# Patient Record
Sex: MALE | Race: WHITE
[De-identification: names, ages, dates, MRNs, and addresses within clinical notes are randomized per-mention and may not be internally consistent; named-entity substitution may affect disease eponyms.]

---

## 2018-02-06 ENCOUNTER — HOSPITAL ENCOUNTER (INPATIENT)
Dept: HOSPITAL 36 - ER | Age: 68
LOS: 3 days | Discharge: NURSING, REHAB OR LONG-TERM CARE | DRG: 871 | End: 2018-02-09
Attending: FAMILY MEDICINE | Admitting: FAMILY MEDICINE
Payer: MEDICARE

## 2018-02-06 DIAGNOSIS — N18.6: ICD-10-CM

## 2018-02-06 DIAGNOSIS — I12.0: ICD-10-CM

## 2018-02-06 DIAGNOSIS — E86.0: ICD-10-CM

## 2018-02-06 DIAGNOSIS — E87.1: ICD-10-CM

## 2018-02-06 DIAGNOSIS — Z83.3: ICD-10-CM

## 2018-02-06 DIAGNOSIS — Z99.2: ICD-10-CM

## 2018-02-06 DIAGNOSIS — A41.9: Primary | ICD-10-CM

## 2018-02-06 DIAGNOSIS — E78.5: ICD-10-CM

## 2018-02-06 DIAGNOSIS — Z86.73: ICD-10-CM

## 2018-02-06 DIAGNOSIS — F03.90: ICD-10-CM

## 2018-02-06 DIAGNOSIS — E11.22: ICD-10-CM

## 2018-02-06 DIAGNOSIS — J18.9: ICD-10-CM

## 2018-02-06 DIAGNOSIS — N39.0: ICD-10-CM

## 2018-02-06 DIAGNOSIS — Z93.0: ICD-10-CM

## 2018-02-06 DIAGNOSIS — Z93.1: ICD-10-CM

## 2018-02-06 DIAGNOSIS — Z82.49: ICD-10-CM

## 2018-02-06 DIAGNOSIS — D63.1: ICD-10-CM

## 2018-02-06 DIAGNOSIS — Z79.82: ICD-10-CM

## 2018-02-06 LAB
ANION GAP SERPL CALC-SCNC: 15.9 MMOL/L (ref 7–16)
APPEARANCE UR: (no result)
BACTERIA #/AREA URNS HPF: (no result) /HPF
BASOPHILS # BLD AUTO: 0.2 TH/CUMM (ref 0–0.2)
BASOPHILS NFR BLD AUTO: 1.7 % (ref 0–2)
BILIRUB UR-MCNC: NEGATIVE MG/DL
BUN SERPL-MCNC: 90 MG/DL (ref 7–25)
CALCIUM SERPL-MCNC: 11.8 MG/DL (ref 8.6–10.3)
CHLORIDE SERPL-SCNC: 95 MEQ/L (ref 98–107)
CHOLEST SERPL-MCNC: 129 MG/DL (ref ?–200)
CK SERPL-CCNC: 27 U/L (ref 30–223)
CO2 SERPL-SCNC: 24.2 MEQ/L (ref 21–31)
COLOR UR: YELLOW
CREAT SERPL-MCNC: 6.4 MG/DL (ref 0.7–1.3)
EOSINOPHIL # BLD AUTO: 0.6 TH/CMM (ref 0.1–0.4)
EOSINOPHIL NFR BLD AUTO: 5.9 % (ref 0–5)
EPI CELLS URNS QL MICRO: (no result) /LPF
ERYTHROCYTE [DISTWIDTH] IN BLOOD BY AUTOMATED COUNT: 15.5 % (ref 11.5–20)
GLUCOSE SERPL-MCNC: 109 MG/DL (ref 70–105)
GLUCOSE UR STRIP-MCNC: NEGATIVE MG/DL
HCT VFR BLD CALC: 36.6 % (ref 41–60)
HDLC SERPL-MCNC: 47 MG/DL (ref 23–92)
HGB BLD-MCNC: 11.9 GM/DL (ref 12–16)
INR PPP: 0.93 (ref 0.5–1.4)
INR PPP: 0.93 (ref 0.5–1.4)
KETONES UR STRIP-MCNC: NEGATIVE MG/DL
LEUKOCYTE ESTERASE UR-ACNC: (no result)
LYMPHOCYTE AB SER FC-ACNC: 0.8 TH/CMM (ref 1.5–3)
LYMPHOCYTES NFR BLD AUTO: 7.6 % (ref 20–50)
MCH RBC QN AUTO: 30.6 PG (ref 27–31)
MCHC RBC AUTO-ENTMCNC: 32.4 PG (ref 28–36)
MCV RBC AUTO: 94.5 FL (ref 80–99)
MICRO URNS: YES
MONOCYTES # BLD AUTO: 0.9 TH/CMM (ref 0.3–1)
MONOCYTES NFR BLD AUTO: 8.8 % (ref 2–10)
NEUTROPHILS # BLD: 7.5 TH/CMM (ref 1.8–8)
NEUTROPHILS NFR BLD AUTO: 76 % (ref 40–80)
NITRITE UR QL STRIP: NEGATIVE
PH UR STRIP: 6 [PH] (ref 4.6–8)
PLATELET # BLD: 396 TH/CMM (ref 150–400)
PMV BLD AUTO: 6.3 FL
POTASSIUM SERPL-SCNC: 4.1 MEQ/L (ref 3.5–5.1)
PROT UR STRIP-MCNC: >=300 MG/DL
PROTHROMBIN TIME: 9.7 SECONDS (ref 9.5–11.5)
PROTHROMBIN TIME: 9.7 SECONDS (ref 9.5–11.5)
RBC # BLD AUTO: 3.87 MIL/CMM (ref 3.8–5.8)
RBC # UR STRIP: (no result) /UL
SODIUM SERPL-SCNC: 131 MEQ/L (ref 136–145)
SP GR UR STRIP: 1.02 (ref 1–1.03)
TRIGL SERPL-MCNC: 123 MG/DL (ref ?–150)
URINALYSIS COMPLETE PNL UR: (no result)
UROBILINOGEN UR STRIP-ACNC: 0.2 E.U./DL (ref 0.2–1)
WBC # BLD AUTO: 10 TH/CMM (ref 4.8–10.8)
WBC #/AREA URNS HPF: (no result) /HPF (ref 0–5)

## 2018-02-06 PROCEDURE — Z7610: HCPCS

## 2018-02-06 NOTE — DIAGNOSTIC IMAGING REPORT
CHEST X-RAY: AP view



INDICATION: pain



COMPARISON: None



FINDINGS: Tracheostomy tube is noted.  Right dialysis catheter is seen

with tip in the right atrium.  Increased interstitial lung markings are

noted.  This is greatest along the left lung base.  There may be a trace

left effusion.  No focal consolidation.  Cardiomegaly is noted. 

Degenerative changes of the spine are noted.



IMPRESSION:



Increased interstitial lung markings, greatest within the lung base,

nonspecific.  There is probable atelectasis of the left lung base.  No

focal consolidation or evidence of [place] CHF



Cardiomegaly.

## 2018-02-06 NOTE — ED PHYSICIAN CHART
ED Chief Complaint/HPI





- Patient Information


Date Seen:: 02/06/18


Time Seen:: 13:15


Chief Complaint:: Weakness


History of Present Illness:: 





onset x one day of weakness and dizziness with elevated BP while at Dialysis 

Center today; no report of LOC, ALOC, AMS, H/As, neck pain, C/P, SOB, Abd. Pain

, A/N/V/D/C, fever, chills, or urinary s/s


Historian:: Patient, EMS


Review:: Nurse's Note Reviewed, Old Chart Reviewed, EMS run form Reviewed





ED Review of Systems





- Review of Systems


General/Constitutional: No fever, No chills, No weight loss, No weakness, No 

diaphoresis, No edema, No loss of appetite


Skin: No skin lesions, No rash, No bruising


Head: No headache, No light-headedness


Eyes: No loss of vision, No pain, No diplopia


ENT: No earache, No nasal drainage, No sore throat, No tinnitus


Neck: No neck pain, No swelling, No thyromegaly, No stiffness, No mass noted


Cardio Vascular: No chest pain, No palpitations, No PND, No orthopnea, No edema


Pulmonary: No SOB, No cough, No sputum, No wheezing


GI: No nausea, No vomiting, No diarrhea, No pain, No melena, No hematochezia, 

No constipation, No hematemesis


G/U: No dysuria, No frequency, No hematuria, No nacturia


Musculoskeletal: No bone or joint pain, No back pain, No muscle pain


Endocrine: No polyuria, No polydipsia


Psychiatric: No prior psych history, No depression, No anxiety, No suicidal 

ideation, No homicidal ideation, No auditory hallucination, No visual 

hallucination


Hematopoietic: No bruising, No lymphadenopathy


Allergic/Immuno: No urticaria, No angioedema


Neurological: No syncope, Focal symptoms, Weakness, Paresthesia, No headache, 

No seizure, Dizziness, Confusion, No vertigo





ED Past Medical History





- Past Medical History


Obtainable: Yes


Past Medical History: HTN, DM, CVA/TIA, Dyslipidemia, ESRD, Dementia


Family History: Diabetes Melitus, HTN


Social History: Non Smoker, No Alcohol, No Drug Use, Single, Care Facility


Surgical History: PEG/GTube, other (Tracheostomy)


Psychiatricy History: Dementia (Tracheostomy)


Medication: Reviewed





Family Medical History





- Family Member


  ** Mother


History Unknown: Yes





ED Physical Exam





- Physical Examination


General/Constitutional: Awake, Well-developed, well-nourished, Alert, No 

distress, GCS 15, Non-toxic appearing, Ambulatory


Head: Atraumatic


Eyes: Lids, conjuctiva normal, PERRL, EOMI


Skin: Nl inspection, No rash, No skin lesions, No ecchymosis, Well hydrated, No 

lymphadenopathy


ENMT: External ears, nose nl, TM canals nl, Nasal exam nl, Lips, teeth, gums nl

, Oropharynx nl, Tonsils nl


Neck: Nontender, Full ROM w/o pain, No JVD, No nuchal rigidity, No bruit, No 

mass, No stridor


Respiratory: Nl effort/Exclusion


Other Respiratory comments:: 





Lungs: + Rales and Rhonchi


Cardio Vascular: RRR, No murmur, gallop, rubs, NL S1 S2, Carotid/Femoral/Distal 

pulses equal bilaterally


GI: No tenderness/rebounding/guarding, No organomegaly, No hernia, Normal BS's, 

Nondistended, No mass/bruits, No McBurney tenderness


: No CVA tenderness


Extremities: No tenderness or effusion, Full ROM, normal strength in all 

extremities, No edema, Normal digits & nails


Neuro/Psych: Alert/oriented, DTR's symmetric, Normal sensory exam, Normal motor 

strength, Judgement/insight normal, Mood normal, Normal gait, No focal deficits


Misc: Normal back, No paraspinal tenderness





ED Labs/Radiology/EKG Results





- Lab Results


Comments:: 





Na+: 131; BUN: 90; Cr: 6.4; H/H: 11.9/36.6; Trop: .09





- Radiology Results


Comments:: 





CXR: Patchy LLL Infiltrate





- EKG Interpretations


EKG Time:: 13:35


Rate & Rhythm: 92; NSR


Comments:: 





old ASMI; non-specific st-t changes





ED Septic Shock





- .


Is Septic Shock (SBP<90, OR Lactate>4 mmol\L) present?: No





ED Reassessment (Disposition)





- Reassessment


Reassessment Condition:: Improved





- Diagnosis


Diagnosis:: 





Dx: PNA; Sepsis; Hyponatremia; Anemia; ESRD; Dehydration; Pre-Renal Azotemia





- Aftercare/Follow up Instructions


Aftercare/Follow-Up Instructions:: Counseled pt regarding lab results/diagnosis 

& need follow up, Counseled pt & family regarding lab results/diagnosis & need 

follow up





- Patient Disposition


Discharge/Transfer:: Acute Care w/in this hosp


Accepting Physician:: Dr. Butler


Time Called:: 0975


Time Responded:: 15:45


Admitted to:: Telemetry


Spoke to:: Dr. Butler


Admitting Medical Physician:: Dr. Butler


Condition at Disposition:: Stable, Improved

## 2018-02-07 LAB
ANION GAP SERPL CALC-SCNC: 15.7 MMOL/L (ref 7–16)
BASOPHILS # BLD AUTO: 0 TH/CUMM (ref 0–0.2)
BASOPHILS NFR BLD AUTO: 0 % (ref 0–2)
BUN SERPL-MCNC: 99 MG/DL (ref 7–25)
CALCIUM SERPL-MCNC: 11.3 MG/DL (ref 8.6–10.3)
CHLORIDE SERPL-SCNC: 95 MEQ/L (ref 98–107)
CHOLEST SERPL-MCNC: 123 MG/DL (ref ?–200)
CO2 SERPL-SCNC: 24.3 MEQ/L (ref 21–31)
CREAT SERPL-MCNC: 7 MG/DL (ref 0.7–1.3)
EOSINOPHIL # BLD AUTO: 0.6 TH/CMM (ref 0.1–0.4)
EOSINOPHIL NFR BLD AUTO: 6.2 % (ref 0–5)
ERYTHROCYTE [DISTWIDTH] IN BLOOD BY AUTOMATED COUNT: 15.6 % (ref 11.5–20)
GLUCOSE SERPL-MCNC: 98 MG/DL (ref 70–105)
HCT VFR BLD CALC: 33.4 % (ref 41–60)
HDLC SERPL-MCNC: 43 MG/DL (ref 23–92)
HGB BLD-MCNC: 10.9 GM/DL (ref 12–16)
LYMPHOCYTE AB SER FC-ACNC: 1 TH/CMM (ref 1.5–3)
LYMPHOCYTES NFR BLD AUTO: 10.6 % (ref 20–50)
MCH RBC QN AUTO: 30.8 PG (ref 27–31)
MCHC RBC AUTO-ENTMCNC: 32.7 PG (ref 28–36)
MCV RBC AUTO: 94.1 FL (ref 80–99)
MONOCYTES # BLD AUTO: 1 TH/CMM (ref 0.3–1)
MONOCYTES NFR BLD AUTO: 11.1 % (ref 2–10)
NEUTROPHILS # BLD: 6.8 TH/CMM (ref 1.8–8)
NEUTROPHILS NFR BLD AUTO: 72.1 % (ref 40–80)
PLATELET # BLD: 345 TH/CMM (ref 150–400)
PMV BLD AUTO: 6.9 FL
POTASSIUM SERPL-SCNC: 4 MEQ/L (ref 3.5–5.1)
RBC # BLD AUTO: 3.55 MIL/CMM (ref 3.8–5.8)
SODIUM SERPL-SCNC: 131 MEQ/L (ref 136–145)
TRIGL SERPL-MCNC: 115 MG/DL (ref ?–150)
WBC # BLD AUTO: 9.4 TH/CMM (ref 4.8–10.8)

## 2018-02-07 PROCEDURE — 5A1D70Z PERFORMANCE OF URINARY FILTRATION, INTERMITTENT, LESS THAN 6 HOURS PER DAY: ICD-10-PCS

## 2018-02-07 RX ADMIN — IPRATROPIUM BROMIDE AND ALBUTEROL SULFATE SCH ML: .5; 3 SOLUTION RESPIRATORY (INHALATION) at 19:58

## 2018-02-07 RX ADMIN — LEVETIRACETAM SCH MG: 100 SOLUTION ORAL at 11:16

## 2018-02-07 RX ADMIN — ASPIRIN 81 MG SCH MG: 81 TABLET ORAL at 09:19

## 2018-02-07 RX ADMIN — IPRATROPIUM BROMIDE AND ALBUTEROL SULFATE SCH ML: .5; 3 SOLUTION RESPIRATORY (INHALATION) at 06:48

## 2018-02-07 RX ADMIN — Medication SCH TAB: at 09:19

## 2018-02-07 RX ADMIN — IPRATROPIUM BROMIDE AND ALBUTEROL SULFATE SCH ML: .5; 3 SOLUTION RESPIRATORY (INHALATION) at 13:21

## 2018-02-07 RX ADMIN — IPRATROPIUM BROMIDE AND ALBUTEROL SULFATE SCH ML: .5; 3 SOLUTION RESPIRATORY (INHALATION) at 01:31

## 2018-02-07 RX ADMIN — DOCUSATE SODIUM SCH MG: 50 LIQUID ORAL at 09:18

## 2018-02-07 RX ADMIN — LEVETIRACETAM SCH MG: 100 SOLUTION ORAL at 00:24

## 2018-02-07 RX ADMIN — DOCUSATE SODIUM SCH MG: 50 LIQUID ORAL at 16:54

## 2018-02-07 NOTE — HISTORY AND PHYSICAL
History of Present Illness





- HPI


Chief Complaint: 


weakness 


HPI: 


 This is a 67 year old male who presents to the ER due to 1 day history of 

weakness. Patient developed tachycardia during dialysis and his blood pressure 

was elevated as well. Patient had a chest xray done in er and shows infiltrates 

, patient is now admitted. 








,


Vital Signs: 





 Last Vital Signs











Temp  98.1 F   02/07/18 12:00


 


Pulse  80   02/07/18 20:03


 


Resp  18   02/07/18 20:03


 


BP  128/87   02/07/18 17:54


 


Pulse Ox  96   02/07/18 20:03














Past Medical History


Other History: 





CK D stage V on hemodialysis, hypertension, CVA





Family Medical History





- Family Member


  ** Mother


History Unknown: Yes





Social History


Smoke: No


Alcohol: None


Drugs: None


Lives: Nursing Home





- Medications


Home Medications: 


Home Medication











 Medication  Instructions  Recorded  Type


 


Acetaminophen [Pain Reliever] 650 mg GT Q6H PRN 02/06/18 History


 


Acetaminophen [Pain Reliever] 650 mg PO Q6H PRN MDD 3gm 02/06/18 History


 


Albuterol/Ipratropium Neb [Duoneb 0.5 mg HHN Q6H 02/06/18 History





Neb]   


 


Albuterol/Ipratropium Neb [Duoneb 2.5 mg HHN Q2H PRN 02/06/18 History





Neb]   


 


Amlodipine Besylate 1 tab GT DAILY 02/06/18 History


 


Aspirin [Aspirin Chewable] 1 tab GT DAILY 02/06/18 History


 


Atorvastatin Calcium [Lipitor] 20 mg GT HS 02/06/18 History


 


Docusate Sodium [Colace] 100 mg GT BID 02/06/18 History


 


Famotidine 20 mg GT DAILY 02/06/18 History


 


Folic Acid [Folate*] 1 mg GT DAILY 02/06/18 History


 


Levetiracetam 500 mg GT Q12H 02/06/18 History


 


Magnesium Hydroxide [Milk of 30 ml GT HS PRN 02/06/18 History





Magnesia]   


 


Metoclopramide [Reglan] 5 mg GT DAILY 02/06/18 History


 


Ondansetron [Zofran Odt] 1 tab GT Q6H PRN 02/06/18 History


 


Prostat Sf 30 ml GT TID 02/06/18 History


 


Vit C/Ascorbate Ca/Ascorb Sod 500 mg GT DAILY 02/06/18 History





[Vitamin C 500 mg/15 ml Liquid]   


 


Vitamin B Complex 1 tab GT DAILY 02/06/18 History


 


cloNIDine HCl [Catapres] 1 tab GT Q6H 02/06/18 History














- Allergies


Allergies/Adverse Reactions: 


 Allergies











Allergy/AdvReac Type Severity Reaction Status Date / Time


 


No Known Allergies Allergy   Verified 02/06/18 13:50














Review of Systems





- Review of Systems


Constitutional: Report: Weakness


Eyes: Report: No Significant


ENT: Report: No Significant


Respiratory: Report: Cough


Cardiovascular: Report: No Significant


Gastrointestinal: Report: No Significant


Genitourinary: Report: No Significant


Musculoskeletal: Report: No Significant


Skin: Report: No Significant


Neurological: Report: Weakness





Physical Exam





- Physical Exam


HEENT: Report: Ears Nose Throat within normal limits


Neck: Report: Within normal limits


Cardiovascular Systems: Report: +s1/s2 noted, Regular, Rate and Rhythm


Respiratory: Report: Breath Sounds are within normal limits


Abdomen: Report: Non-tender to palpation


Back: Report: Inspection of back is within normal limits.


Extremities: Report: Non-tender to palpation.


Neuro/Psych: Report: Mood affect is within normal limits





- Lab Results


All Lab Results last 24 hours: 





 Laboratory Results - last 24 hr











  02/07/18 02/07/18 02/07/18





  04:57 04:57 04:57


 


WBC  9.4  


 


RBC  3.55 L  


 


Hgb  10.9 L  


 


Hct  33.4 L  


 


MCV  94.1  


 


MCH  30.8  


 


MCHC Differential  32.7  


 


RDW  15.6  


 


Plt Count  345  


 


MPV  6.9  


 


Neutrophils %  72.1  


 


Lymphocytes %  10.6 L  


 


Monocytes %  11.1 H  


 


Eosinophils %  6.2 H  


 


Basophils %  0.0  


 


Sodium   131 L 


 


Potassium   4.0 


 


Chloride   95 L 


 


Carbon Dioxide   24.3 


 


Anion Gap   15.7 


 


BUN   99 H* 


 


Creatinine   7.0 H* 


 


Est GFR ( Amer)   10.2 


 


Est GFR (Non-Af Amer)   8.4 


 


BUN/Creatinine Ratio   14.1 


 


Glucose   98 


 


Calcium   11.3 H 


 


Triglycerides   115 


 


Cholesterol   123 


 


LDL Cholesterol Direct   70 L 


 


HDL Cholesterol   43 


 


TSH    1.45














- Assessment


Assessment: 





Assessment: 


1.  Pneumonia.


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


3.  Hypertension.


4.  CVA.





- Plan


Plan: 





id consult


bronchodilators


continue current orders

## 2018-02-07 NOTE — CONSULTATION
Consult Note





- Consult Note


Service Date: 02/07/18


Referring Physician: Kristina Butler


Consult Note: 


PHYSICIAN Consultation Note:





Date of Admission: 02/06/18





Purpose of Consultation: 





Chief Complaint: 


Patient SHERWIN ESQUIVEL was admitted to location [facility] with 

PNEUMONIA,RENAL FAILURE.


History of Present Illness:


67-year-old male with a past medical history of CK D stage V on hemodialysis, 

hypertension, CVA, developed tachycardia during his dialysis.  His blood 

pressure was also also has side.  She was sent to the ER for further evaluation 

and management.  On initial evaluation, patient's temperature 99.6 Fahrenheit 

and WBC count was 10,000.  Chest x-ray showed infiltrates.  On further 

evaluation he was found to have pneumonia.  Patient was started on Rocephin.  

An ID consult was called for further antibiotic management.  Besides this, he 

is on the T bar oxygen and has increased endotracheal secretions.





Past Medical History:  CK D stage V on hemodialysis, hypertension, CVA,





Allergies











Allergy/AdvReac Type Severity Reaction Status Date / Time


 


No Known Allergies Allergy   Verified 02/06/18 13:50








 Vital Signs











Temp  96.7 F   02/07/18 04:00


 


Pulse  97   02/07/18 13:21


 


Resp  19   02/07/18 13:21


 


BP  145/85   02/07/18 09:18


 


Pulse Ox  99   02/07/18 13:21








 Intake & Output











 02/06/18 02/07/18 02/07/18





 18:59 06:59 18:59


 


Intake Total  830 


 


Output Total  0 


 


Balance  830 


 


Weight (lbs)  86.183 kg 


 


Intake:   


 


  Intake, IV Amount  300 


 


    Azithromycin 500 mg In  250 





    Sodium Chloride 0.9% 250   





    ml @ 250 mls/hr IV Q24HR   





    TOMMY Rx#:[id number]   


 


    cefTRIAXone 1 gm In  50 





    Sodium Chloride 0.9% 50   





    ml @ 100 mls/hr IV Q24HR   





    TOMMY Rx#:[id number]   


 


  Oral  0 


 


  Tube Feeding  480 


 


  Other  50 


 


Output:   


 


  Urine  0 


 


Other:   


 


  # Voids  0 


 


  # Bowel Movements  1 








 Laboratory Results - last 24 hr











  02/06/18 02/07/18 02/07/18





  16:50 04:57 04:57


 


WBC   9.4 


 


RBC   3.55 L 


 


Hgb   10.9 L 


 


Hct   33.4 L 


 


MCV   94.1 


 


MCH   30.8 


 


MCHC Differential   32.7 


 


RDW   15.6 


 


Plt Count   345 


 


MPV   6.9 


 


Neutrophils %   72.1 


 


Lymphocytes %   10.6 L 


 


Monocytes %   11.1 H 


 


Eosinophils %   6.2 H 


 


Basophils %   0.0 


 


Sodium    131 L


 


Potassium    4.0


 


Chloride    95 L


 


Carbon Dioxide    24.3


 


Anion Gap    15.7


 


BUN    99 H*


 


Creatinine    7.0 H*


 


Est GFR ( Amer)    10.2


 


Est GFR (Non-Af Amer)    8.4


 


BUN/Creatinine Ratio    14.1


 


Glucose    98


 


Calcium    11.3 H


 


Triglycerides    115


 


Cholesterol    123


 


LDL Cholesterol Direct    70 L


 


HDL Cholesterol    43


 


TSH   


 


Urine Source  CLEAN C  


 


Urine Color  YELLOW  


 


Urine Clarity  HAZY  


 


Urine pH  6.0  


 


Ur Specific Gravity  1.025  


 


Urine Protein  >=300  


 


Urine Glucose (UA)  NEGATIVE  


 


Urine Ketones  NEGATIVE  


 


Urine Blood  MODERATE H  


 


Urine Nitrate  NEGATIVE  


 


Urine Bilirubin  NEGATIVE  


 


Urine Urobilinogen  0.2  


 


Ur Leukocyte Esterase  SMALL H  


 


Urine RBC  10-25 H  


 


Urine WBC  25-50 H  


 


Ur Epithelial Cells  NONE SEEN  


 


Urine Bacteria  FEW  














  02/07/18





  04:57


 


WBC 


 


RBC 


 


Hgb 


 


Hct 


 


MCV 


 


MCH 


 


MCHC Differential 


 


RDW 


 


Plt Count 


 


MPV 


 


Neutrophils % 


 


Lymphocytes % 


 


Monocytes % 


 


Eosinophils % 


 


Basophils % 


 


Sodium 


 


Potassium 


 


Chloride 


 


Carbon Dioxide 


 


Anion Gap 


 


BUN 


 


Creatinine 


 


Est GFR ( Amer) 


 


Est GFR (Non-Af Amer) 


 


BUN/Creatinine Ratio 


 


Glucose 


 


Calcium 


 


Triglycerides 


 


Cholesterol 


 


LDL Cholesterol Direct 


 


HDL Cholesterol 


 


TSH  1.45


 


Urine Source 


 


Urine Color 


 


Urine Clarity 


 


Urine pH 


 


Ur Specific Gravity 


 


Urine Protein 


 


Urine Glucose (UA) 


 


Urine Ketones 


 


Urine Blood 


 


Urine Nitrate 


 


Urine Bilirubin 


 


Urine Urobilinogen 


 


Ur Leukocyte Esterase 


 


Urine RBC 


 


Urine WBC 


 


Ur Epithelial Cells 


 


Urine Bacteria 








Home Medication











 Medication  Instructions  Recorded  Type


 


Acetaminophen [Pain Reliever] 650 mg GT Q6H PRN 02/06/18 History


 


Acetaminophen [Pain Reliever] 650 mg PO Q6H PRN MDD 3gm 02/06/18 History


 


Albuterol/Ipratropium Neb [Duoneb 0.5 mg HHN Q6H 02/06/18 History





Neb]   


 


Albuterol/Ipratropium Neb [Duoneb 2.5 mg HHN Q2H PRN 02/06/18 History





Neb]   


 


Amlodipine Besylate 1 tab GT DAILY 02/06/18 History


 


Aspirin [Aspirin Chewable] 1 tab GT DAILY 02/06/18 History


 


Atorvastatin Calcium [Lipitor] 20 mg GT HS 02/06/18 History


 


Docusate Sodium [Colace] 100 mg GT BID 02/06/18 History


 


Famotidine 20 mg GT DAILY 02/06/18 History


 


Folic Acid [Folate*] 1 mg GT DAILY 02/06/18 History


 


Levetiracetam 500 mg GT Q12H 02/06/18 History


 


Magnesium Hydroxide [Milk of 30 ml GT HS PRN 02/06/18 History





Magnesia]   


 


Metoclopramide [Reglan] 5 mg GT DAILY 02/06/18 History


 


Ondansetron [Zofran Odt] 1 tab GT Q6H PRN 02/06/18 History


 


Prostat Sf 30 ml GT TID 02/06/18 History


 


Vit C/Ascorbate Ca/Ascorb Sod 500 mg GT DAILY 02/06/18 History





[Vitamin C 500 mg/15 ml Liquid]   


 


Vitamin B Complex 1 tab GT DAILY 02/06/18 History


 


cloNIDine HCl [Catapres] 1 tab GT Q6H 02/06/18 History








Current Medications











Generic Name Dose Route Start Last Admin





  Trade Name Freq  PRN Reason Stop Dose Admin


 


Acetaminophen  650 mg  02/06/18 22:42  





  Tylenol 650mg/20.3ml Suspension  GT   





  Q6H PRN   





  Fever > 101   


 


Acetaminophen  650 mg  02/06/18 22:42  





  Tylenol 650mg/20.3ml Suspension  PO   





  Q6H PRN   





  Pain (Mild)   


 


Albuterol/Ipratropium  3 ml  02/07/18 01:00  02/07/18 13:21





  Duoneb [facility]  04/08/18 00:59  3 ml





  Q6HRT TOMMY   Administration


 


Albuterol/Ipratropium  3 ml  02/06/18 22:42  





  Duoneb Neb  [facility]  04/07/18 22:41  





  Q2H PRN   





  Shortness of Breath   


 


Amlodipine Besylate  5 mg  02/07/18 09:00  02/07/18 09:18





  Norvasc  GT  04/08/18 08:59  5 mg





  DAILY TOMMY   Administration


 


Aspirin  81 mg  02/07/18 09:00  02/07/18 09:19





  Aspirin Chewable  GT  04/08/18 08:59  81 mg





  DAILY TOMMY   Administration


 


Atorvastatin Calcium  20 mg  02/07/18 21:00  





  Lipitor  GT  04/08/18 20:59  





  HS TOMMY   


 


Docusate Sodium  100 mg  02/07/18 09:00  02/07/18 09:18





  Colace  GT  04/08/18 08:59  100 mg





  BID TOMMY   Administration


 


Famotidine  20 mg  02/07/18 09:00  02/07/18 09:18





  Pepcid  GT  04/08/18 08:59  20 mg





  DAILY TOMMY   Administration


 


Folic Acid  1 mg  02/07/18 09:00  02/07/18 09:19





  Folate  GT  04/08/18 08:59  1 mg





  DAILY TOMMY   Administration


 


Azithromycin 500 mg/ Sodium  250 mls @ 250 mls/hr  02/06/18 17:42  02/06/18 23:

51





  Chloride  IV  04/07/18 17:41  Infused





  Q24HR TOMMY   Infusion


 


Ceftriaxone Sodium 1 gm/  50 mls @ 100 mls/hr  02/06/18 17:42  02/06/18 22:15





  Sodium Chloride  IV  04/07/18 17:41  Infused





  Q24HR TOMMY   Infusion


 


Levetiracetam  500 mg  02/06/18 22:45  02/07/18 11:16





  Keppra  GT  04/07/18 22:44  500 mg





  Q12H TOMMY   Administration


 


Magnesium Hydroxide  30 ml  02/06/18 22:42  





  Milk Of Magnesia  GT  04/07/18 22:41  





  HS PRN   





  Constipation   


 


Metoclopramide HCl  10 mg  02/07/18 09:00  02/07/18 09:18





  Reglan  GT  04/08/18 08:59  10 mg





  DAILY TOMMY   Administration


 


Miscellaneous  1 ea  02/07/18 11:03  





  Clinical Monitoring  MC  04/08/18 11:02  





  PRN PRN   





  RENAL   


 


Ondansetron HCl  4 mg  02/06/18 22:42  





  Zofran Odt  PO  04/07/18 22:41  





  Q6H PRN   





  Nausea / Vomiting   


 


Vitamin B Complex/Vitamin C  1 tab  02/07/18 09:00  02/07/18 09:19





  Vitamin B Complex W/C  GT  04/08/18 08:59  1 tab





  DAILY TOMMY   Administration








Review of Systems:


A 12 point ROS was reviewed with the pertinent positive and negatives noted in 

the HPI.





Social History





Smoking Status                   Never smoker


Drug Use                         No


Alcohol Use                      No





Family Medical History





Family Medical History                                     Start:  02/06/18 16:

45


Freq:   ONCE                                               Status: Active      

  


 Document     02/06/18 18:20  KACIE  (Rec: 02/06/18 18:20  KACIE [id number])


 Family Medical History


     Mother


      History Unknown                            Yes





Physical Exam:





General: Comfortable well-nourished well-developed.  





HEENT: Head: Normocephalic, atraumatic.  Oral cavity moist, pink tongue.  Eyes: 

Pallor is present icterus.  Face symmetrical.





Neck: Trach site is clear there is yellow with the thick indurated constipation 

in the every





Cardio: S1 and S2 within normal limits regular rhythm.





Respiratory: Vesicular breath sound, no crackles, no wheezing.  





Abdominal: Soft, nontender nondistended bowel sounds present G-tube site is 

clear.





Genital/Urinary: Deferred.





Extremities: No cyanosis, no clubbing no edema





Neurological: Alert, awake.





Assessment: 


1.  Pneumonia.


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


3.  Hypertension.


4.  CVA.





Plan: 


Continue Rocephin and Zithromax.





Thank you Dr. Butler for involving me in taking care of this patient








Signed,





Ritesh Viera M.D.


02/07/[id number]

## 2018-02-08 RX ADMIN — LEVETIRACETAM SCH MG: 100 SOLUTION ORAL at 23:12

## 2018-02-08 RX ADMIN — LEVETIRACETAM SCH MG: 100 SOLUTION ORAL at 01:21

## 2018-02-08 RX ADMIN — DOCUSATE SODIUM SCH MG: 50 LIQUID ORAL at 09:11

## 2018-02-08 RX ADMIN — IPRATROPIUM BROMIDE AND ALBUTEROL SULFATE SCH ML: .5; 3 SOLUTION RESPIRATORY (INHALATION) at 07:11

## 2018-02-08 RX ADMIN — IPRATROPIUM BROMIDE AND ALBUTEROL SULFATE SCH ML: .5; 3 SOLUTION RESPIRATORY (INHALATION) at 13:36

## 2018-02-08 RX ADMIN — Medication SCH: at 09:11

## 2018-02-08 RX ADMIN — IPRATROPIUM BROMIDE AND ALBUTEROL SULFATE SCH ML: .5; 3 SOLUTION RESPIRATORY (INHALATION) at 20:23

## 2018-02-08 RX ADMIN — IPRATROPIUM BROMIDE AND ALBUTEROL SULFATE SCH ML: .5; 3 SOLUTION RESPIRATORY (INHALATION) at 01:16

## 2018-02-08 RX ADMIN — LEVETIRACETAM SCH MG: 100 SOLUTION ORAL at 11:37

## 2018-02-08 RX ADMIN — ASPIRIN 81 MG SCH MG: 81 TABLET ORAL at 09:11

## 2018-02-08 RX ADMIN — DOCUSATE SODIUM SCH MG: 50 LIQUID ORAL at 18:57

## 2018-02-08 NOTE — GENERAL PROGRESS NOTE
Subjective





- Review of Systems


Service Date: 18


Events since last encounter: 





awake, weak, afebrile  





Objective





- Results


Result Diagrams: 


 18 04:57





 18 04:57


Recent Labs: 


 Laboratory Last Values











WBC  9.4 Th/cmm (4.8-10.8)   18  04:57    


 


RBC  3.55 Mil/cmm (3.80-5.80)  L  18  04:57    


 


Hgb  10.9 gm/dL (12-16)  L  18  04:57    


 


Hct  33.4 % (41.0-60)  L  18  04:57    


 


MCV  94.1 fl (80-99)   18  04:57    


 


MCH  30.8 pg (27.0-31.0)   18  04:57    


 


MCHC Differential  32.7 pg (28.0-36.0)   18  04:57    


 


RDW  15.6 % (11.5-20.0)   18  04:57    


 


Plt Count  345 Th/cmm (150-400)   18  04:57    


 


MPV  6.9 fl  18  04:57    


 


Neutrophils %  72.1 % (40.0-80.0)   18  04:57    


 


Lymphocytes %  10.6 % (20.0-50.0)  L  18  04:57    


 


Monocytes %  11.1 % (2.0-10.0)  H  18  04:57    


 


Eosinophils %  6.2 % (0.0-5.0)  H  18  04:57    


 


Basophils %  0.0 % (0.0-2.0)   18  04:57    


 


PT  9.7 SECONDS (9.5-11.5)   18  13:50    


 


INR  0.93  (0.5-1.4)   18  13:50    


 


PTT (Actin FS)  22.7 SECONDS (26.0-38.0)  L  18  13:50    


 


Sodium  131 mEq/L (136-145)  L  18  04:57    


 


Potassium  4.0 mEq/L (3.5-5.1)   18  04:57    


 


Chloride  95 mEq/L ()  L  18  04:57    


 


Carbon Dioxide  24.3 mEq/L (21.0-31.0)   18  04:57    


 


Anion Gap  15.7  (7.0-16.0)   18  04:57    


 


BUN  99 mg/dL (7-25)  H*  18  04:57    


 


Creatinine  7.0 mg/dL (0.7-1.3)  H*  18  04:57    


 


Est GFR (African Amer)  10.2 ml/min (>90)   18  04:57    


 


Est GFR (Non-Af Amer)  8.4 ml/min  18  04:57    


 


BUN/Creatinine Ratio  14.1   18  04:57    


 


Glucose  98 mg/dL ()   18  04:57    


 


Whole Bld Lactic Acid  1.04 mmol/L (0.60-1.99)   18  13:50    


 


Calcium  11.3 mg/dL (8.6-10.3)  H  18  04:57    


 


Creatine Kinase  27 U/L ()  L  18  13:50    


 


Troponin I  0.09 ng/mL (0.01-0.05)  H*  18  13:50    


 


B-Natriuretic Peptide  60.1 pg/mL (5.0-100.0)   18  13:50    


 


Triglycerides  115 mg/dL (<150)   18  04:57    


 


Cholesterol  123 mg/dL (<200)   18  04:57    


 


LDL Cholesterol Direct  70 mg/dL ()  L  18  04:57    


 


HDL Cholesterol  43 mg/dL (23-92)   18  04:57    


 


TSH  1.45 uIU/ml (0.34-5.60)   18  04:57    


 


Urine Source  CLEAN C   18  16:50    


 


Urine Color  YELLOW   18  16:50    


 


Urine Clarity  HAZY  (CLEAR)   18  16:50    


 


Urine pH  6.0  (4.6 - 8.0)   18  16:50    


 


Ur Specific Gravity  1.025  (1.005-1.030)   18  16:50    


 


Urine Protein  >=300 mg/dL (NEGATIVE)   18  16:50    


 


Urine Glucose (UA)  NEGATIVE mg/dL (NEGATIVE)   18  16:50    


 


Urine Ketones  NEGATIVE mg/dL (NEGATIVE)   18  16:50    


 


Urine Blood  MODERATE  (NEGATIVE)  H  18  16:50    


 


Urine Nitrate  NEGATIVE  (NEGATIVE)   18  16:50    


 


Urine Bilirubin  NEGATIVE  (NEGATIVE)   18  16:50    


 


Urine Urobilinogen  0.2 E.U./dL (0.2 - 1.0)   18  16:50    


 


Ur Leukocyte Esterase  SMALL  (NEGATIVE)  H  18  16:50    


 


Urine RBC  10-25 /hpf (0-5)  H  18  16:50    


 


Urine WBC  25-50 /hpf (0-5)  H  18  16:50    


 


Ur Epithelial Cells  NONE SEEN /lpf (FEW)   18  16:50    


 


Urine Bacteria  FEW /hpf (NONE SEEN)   18  16:50    














- Physical Exam


Vitals and I&O: 


 Vital Signs











Temp  97.4 F   18 07:45


 


Pulse  73   18 07:45


 


Resp  17   18 07:45


 


BP  114/79   18 07:45


 


Pulse Ox  99   18 07:45








 Intake & Output











 18





 18:59 06:59 18:59


 


Intake Total 540 420 


 


Balance 540 420 


 


Weight (lbs) 86.183 kg 86.183 kg 


 


Intake:   


 


  Tube Feeding 540 420 


 


Other:   


 


  # Voids 2 1 


 


  # Bowel Movements 1 1 


 


  Stool Characteristics Soft Soft 











Active Medications: 


Current Medications





Acetaminophen (Tylenol 650mg/20.3ml Suspension)  650 mg GT Q6H PRN


   PRN Reason: Fever > 101


Acetaminophen (Tylenol 650mg/20.3ml Suspension)  650 mg PO Q6H PRN


   PRN Reason: Pain (Mild)


Albuterol/Ipratropium (Duoneb Neb)  3 ml HHN Q6HRT TOMMY


   Stop: 18 00:59


   Last Admin: 18 07:11 Dose:  3 ml


Albuterol/Ipratropium (Duoneb Neb)  3 ml HHN Q2H PRN


   PRN Reason: Shortness of Breath


   Stop: 18 22:41


Amlodipine Besylate (Norvasc)  5 mg GT DAILY [facility]


   Stop: 18 08:59


   Last Admin: 18 09:18 Dose:  5 mg


Aspirin (Aspirin Chewable)  81 mg GT DAILY TOMMY


   Stop: 18 08:59


   Last Admin: 18 09:19 Dose:  81 mg


Atorvastatin Calcium (Lipitor)  20 mg GT HS TOMMY


   Stop: 18 20:59


   Last Admin: 18 01:21 Dose:  20 mg


Docusate Sodium (Colace)  100 mg GT BID TOMMY


   Stop: 18 08:59


   Last Admin: 18 16:54 Dose:  100 mg


Famotidine (Pepcid)  20 mg GT DAILY [facility]


   Stop: 18 08:59


   Last Admin: 18 09:18 Dose:  20 mg


Folic Acid (Folate)  1 mg GT DAILY [facility]


   Stop: 18 08:59


   Last Admin: 18 09:19 Dose:  1 mg


Heparin Sodium (Porcine) (Heparin)  5,000 units SUBQ Q12H TOMMY


   Stop: 18 20:59


   Last Admin: 18 22:33 Dose:  5,000 units


Azithromycin 500 mg/ Sodium (Chloride)  250 mls @ 250 mls/hr IV Q24HR [facility]


   Stop: 18 17:41


   Last Admin: 18 16:54 Dose:  250 mls/hr


Ceftriaxone Sodium 1 gm/ (Sodium Chloride)  50 mls @ 100 mls/hr IV Q24HR [facility]


   Stop: 18 17:41


   Last Admin: 18 16:53 Dose:  100 mls/hr


Levetiracetam (Keppra)  500 mg GT Q12H [facility]


   Stop: 18 22:44


   Last Admin: 18 01:21 Dose:  500 mg


Magnesium Hydroxide (Milk Of Magnesia)  30 ml GT HS PRN


   PRN Reason: Constipation


   Stop: 18 22:41


Metoclopramide HCl (Reglan)  10 mg GT DAILY [facility]


   Stop: 18 08:59


   Last Admin: 18 09:18 Dose:  10 mg


Miscellaneous (Clinical Monitoring)  1 ea MC PRN PRN


   PRN Reason: RENAL


   Stop: 18 11:02


Miscellaneous (Vte Chemical Prophylaxis Screen/ Admission)  1 ea MC PRN PRN


   PRN Reason: PROTOCOL


   Stop: 18 17:14


Ondansetron HCl (Zofran Odt)  4 mg PO Q6H PRN


   PRN Reason: Nausea / Vomiting


   Stop: 18 22:41


Vitamin B Complex/Vitamin C (Vitamin B Complex W/C)  1 tab GT DAILY [facility]


   Stop: 18 08:59


   Last Admin: 18 09:19 Dose:  1 tab








General: No acute distress


HEENT: Atraumatic


Neck: Supple


Lungs: Clear to auscultation


Abdomen: Bowel sounds, Soft





Assessment/Plan





- Assessment


Assessment: 





Assessment: 


1.  Pneumonia.


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


3.  Hypertension.


4.  CVA.





- Plan


Plan: 





id consult


bronchodilators


continue current orders 





Nutritional Asmnt/Malnutr-PDOC





- Dietary Evaluation


Malnutrition Findings (Please click <Entered> for more info): 








Nutritional Asmnt/Malnutrition                             Start:  18 17:

38


Text:                                                      Status: Complete    

  


Freq:                                                                          

  


 Document     18 17:38  LCHENG  (Rec: 18 17:48  LCHENG  [id number])


 Nutritional Asmnt/Malnutrition


     Patient General Information


      Nutritional Screening                      High Risk


                                                 Consult


      Diagnosis                                  PNA, renal failure


      Pertinent Medical Hx/Surgical Hx           HTN, Dm, CVA, TIA,


                                                 dyslipidemia, ESRD, dementia,


                                                 PEG, tracheostomy


      Subjective Information                     Consult received for pressure


                                                 ulcer. Pt seen lying in bed at


                                                 time of visit. Verified TF


                                                 Novasource running at 40ml/hr.


                                                 Spoke with RN, verified TF is


                                                 scheduled at 45ml/hr, on at


                                                 1pm and off at 8am, which is


                                                 19hr in total. RN will correct


                                                 TF rate and order information


                                                 . per nurse notes , no


                                                 residual noted. Per EMR, pt is


                                                 on dialysis noted.


      Current Diet Order/ Nutrition Support      Novosource Renal 45ml/hr x


                                                 16hr


      Pertinent Medications                      colace, faloate, reglan,


                                                 vitamin B complex, vit C


      Pertinent Labs                              Na 131, K 4.0, Cl 95, BUN


                                                 99, Cr 7.0, Glucose 98, Ca 11.


                                                 3


     Nutritional Hx/Data


      Height                                     1.8 m


      Height (Calculated Centimeters)            180.3


      Current Weight (lbs)                       86.183 kg


      Weight (Calculated Kilograms)              86.2


      Weight (Calculated Grams)                  51738.6


      Ideal Body Weight                          172


      % Ideal Body Weight                        110


      Body Mass Index (BMI)                      26.4


      Weight Status                              Overweight


     GI Symptoms


      GI Symptoms                                None


      Last BM                                    2/7


      Difficult in:                              None


      Skin Integrity/Comment:                    pressure area to coccyx


     Estimated Nutritional Goals


      Calories/Kcals/Kg                          25-30 based on IBW 75kg


      Kcals Calculated                           5515-1357


      Protein g/k-1.2


      Protein Calculated                         75-90


      Fluid: ml                                  1725-2025ml (1ml/kcal)


     Nutritional Problem


      2. Problem


       Problem                                   increased nutition needs (


                                                 calorie and protein)


       Etiology                                  increrased metabolic demand


                                                 for wound healing


       Signs/Symptoms:                           pressure ulcer to coccyx


      1. Problem


       Problem                                   altered nutrition related lab


                                                 values


       Etiology                                  hx of ESRD


       Signs/Symptoms:                           BUN 99, Cr 7.0


     Malnutrition Alert


      Protein-Calorie Malnutrition               N/A


      Is there a minimum of two criteria         No


       selected?                                 


       Query Text:Check all the applicable       


       criteria. A minimum of two criteria are   


       recommended for diagnosis of either       


       severe or non-severe malnutrition.        


     Intervention/Recommendation


      Comments                                   1. Continue with current TF


                                                 regimen Novasource 45ml/hr x


                                                 19hr. This provids 1710kcal


                                                 and 78g protein, meeting 100%


                                                 of nutritional needs.


                                                 2. Recommend arginaid 1pk


                                                 daily via G tube to help wound


                                                 healing.


                                                 3. Monitor TF rate, tolerance,


                                                 wt weekly, skin integrity and


                                                 labs


                                                 4. F/U as moderate risk in 3-5


                                                 days, 2/10-


     Expected Outcomes/Goals


      Expected Outcomes/Goals                    1. Pt to meet at least 75% of


                                                 nutritional needs via


                                                 nutrition support with


                                                 tolerance


                                                 2. Wt stability, skin to


                                                 remain intact, labs to


                                                 approach WNL.

## 2018-02-08 NOTE — CONSULTATION
Consult Note





- Consult Note


Service Date: 02/08/18


Referring Physician: Kristina Butler


Consult Note: 


PHYSICIAN Consultation Note:





Date of Admission: 02/06/18





Purpose of Consultation: ESRD





Chief Complaint: TACHYCARDIA





History of Present Illness:


Patient SHERWIN ESQUIVEL was admitted to location Medical/Surgical Unit I with 

PNEUMONIA,RENAL FAILURE.  HE PRESENTED WITH TACHYCARDIA AND HIGH BP DURING 

DIALYSIS.  HE HAS ESRD AND DIALYZES AT [facility] [place] WITH DR ARELLANO. HE 

WAS FOUND TO 


HAVE FEVER AND ELEVATED WBC COUNT.  HE WAS ADMITTED AND PLACED ON ABX FOR UTI


AND PNEUMONIA.  HE IS DUE FOR HIS NEXT DIALYSIS TOMORROW.  PER WIFE SHE THINKS 

THE


TACHYCARDIA IS DUE TO FLUID REMOVAL DURING DIALYSIS.





Past Medical History: 





Diagnoses





HYP CHR KIDNEY DISEASE W STAGE 5 CHR KIDNEY DISEASE OR ESRD (02/06/18)


PNEUMONIA, UNSPECIFIED ORGANISM (02/06/18)


END STAGE RENAL DISEASE (02/06/18)


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


TRACHEOSTOMY STATUS (02/06/18)


GASTROSTOMY STATUS (02/06/18)


DEPENDENCE ON RENAL DIALYSIS (02/06/18)





Allergies











Allergy/AdvReac Type Severity Reaction Status Date / Time


 


No Known Allergies Allergy   Verified 02/06/18 13:50








 Vital Signs











Temp  97.7 F   02/08/18 15:58


 


Pulse  78   02/08/18 15:58


 


Resp  17   02/08/18 15:58


 


BP  134/95   02/08/18 15:58


 


Pulse Ox  98   02/08/18 15:58








 Intake & Output











 02/07/18 02/08/18 02/08/18





 18:59 06:59 18:59


 


Intake Total 540 420 


 


Balance 540 420 


 


Weight (lbs) 86.183 kg 86.183 kg 


 


Intake:   


 


  Tube Feeding 540 420 


 


Other:   


 


  # Voids 2 1 


 


  # Bowel Movements 1 1 


 


  Stool Characteristics Soft Soft 








Home Medication











 Medication  Instructions  Recorded  Type


 


Acetaminophen [Pain Reliever] 650 mg GT Q6H PRN 02/06/18 History


 


Acetaminophen [Pain Reliever] 650 mg PO Q6H PRN MDD 3gm 02/06/18 History


 


Albuterol/Ipratropium Neb [Duoneb 0.5 mg HHN Q6H 02/06/18 History





Neb]   


 


Albuterol/Ipratropium Neb [Duoneb 2.5 mg HHN Q2H PRN 02/06/18 History





Neb]   


 


Amlodipine Besylate 1 tab GT DAILY 02/06/18 History


 


Aspirin [Aspirin Chewable] 1 tab GT DAILY 02/06/18 History


 


Atorvastatin Calcium [Lipitor] 20 mg GT HS 02/06/18 History


 


Docusate Sodium [Colace] 100 mg GT BID 02/06/18 History


 


Famotidine 20 mg GT DAILY 02/06/18 History


 


Folic Acid [Folate*] 1 mg GT DAILY 02/06/18 History


 


Levetiracetam 500 mg GT Q12H 02/06/18 History


 


Magnesium Hydroxide [Milk of 30 ml GT HS PRN 02/06/18 History





Magnesia]   


 


Metoclopramide [Reglan] 5 mg GT DAILY 02/06/18 History


 


Ondansetron [Zofran Odt] 1 tab GT Q6H PRN 02/06/18 History


 


Prostat Sf 30 ml GT TID 02/06/18 History


 


Vit C/Ascorbate Ca/Ascorb Sod 500 mg GT DAILY 02/06/18 History





[Vitamin C 500 mg/15 ml Liquid]   


 


Vitamin B Complex 1 tab GT DAILY 02/06/18 History


 


cloNIDine HCl [Catapres] 1 tab GT Q6H 02/06/18 History








Current Medications











Generic Name Dose Route Start Last Admin





  Trade Name Freq  PRN Reason Stop Dose Admin


 


Acetaminophen  650 mg  02/06/18 22:42  





  Tylenol 650mg/20.3ml Suspension  GT   





  Q6H PRN   





  Fever > 101   


 


Acetaminophen  650 mg  02/06/18 22:42  





  Tylenol 650mg/20.3ml Suspension  PO   





  Q6H PRN   





  Pain (Mild)   


 


Albuterol/Ipratropium  3 ml  02/07/18 01:00  02/08/18 13:36





  Duoneb Neb  [facility]  04/08/18 00:59  3 ml





  Q6HRT TOMMY   Administration


 


Albuterol/Ipratropium  3 ml  02/06/18 22:42  





  Duoneb Neb  [facility]  04/07/18 22:41  





  Q2H PRN   





  Shortness of Breath   


 


Amlodipine Besylate  5 mg  02/07/18 09:00  02/08/18 09:11





  Norvasc  GT  04/08/18 08:59  5 mg





  DAILY TOMMY   Administration


 


Aspirin  81 mg  02/07/18 09:00  02/08/18 09:11





  Aspirin Chewable  GT  04/08/18 08:59  81 mg





  DAILY TOMMY   Administration


 


Atorvastatin Calcium  20 mg  02/07/18 21:00  02/08/18 01:21





  Lipitor  GT  04/08/18 20:59  20 mg





  HS TOMMY   Administration


 


Docusate Sodium  100 mg  02/07/18 09:00  02/08/18 09:11





  Colace  GT  04/08/18 08:59  100 mg





  BID TOMMY   Administration


 


Famotidine  20 mg  02/07/18 09:00  02/08/18 09:11





  Pepcid  GT  04/08/18 08:59  20 mg





  DAILY TOMMY   Administration


 


Folic Acid  1 mg  02/07/18 09:00  02/08/18 09:11





  Folate  GT  04/08/18 08:59  1 mg





  DAILY TOMMY   Administration


 


Heparin Sodium (Porcine)  5,000 units  02/07/18 21:00  02/08/18 09:10





  Heparin  SUBQ  04/08/18 20:59  5,000 units





  Q12H TOMMY   Administration


 


Azithromycin 500 mg/ Sodium  250 mls @ 250 mls/hr  02/06/18 17:42  02/07/18 16:

54





  Chloride  IV  04/07/18 17:41  250 mls/hr





  Q24HR TOMMY   Administration


 


Ceftriaxone Sodium 1 gm/  50 mls @ 100 mls/hr  02/06/18 17:42  02/07/18 16:53





  Sodium Chloride  IV  04/07/18 17:41  100 mls/hr





  Q24HR TOMMY   Administration


 


Lactobacillus Rhamnosus  1 each  02/09/18 09:00  





  Culturelle 15b  PO  04/10/18 08:59  





  DAILY TOMMY   


 


Levetiracetam  500 mg  02/06/18 22:45  02/08/18 11:37





  Keppra  GT  04/07/18 22:44  500 mg





  Q12H TOMMY   Administration


 


Magnesium Hydroxide  30 ml  02/06/18 22:42  





  Milk Of Magnesia  GT  04/07/18 22:41  





  HS PRN   





  Constipation   


 


Metoclopramide HCl  10 mg  02/07/18 09:00  02/08/18 09:10





  Reglan  GT  04/08/18 08:59  10 mg





  DAILY TOMMY   Administration


 


Miscellaneous  1 ea  02/07/18 11:03  





  Clinical Monitoring    04/08/18 11:02  





  PRN PRN   





  RENAL   


 


Miscellaneous  1 ea  02/07/18 17:15  





  Vte Chemical Prophylaxis Screen/ Admission    04/08/18 17:14  





  PRN PRN   





  PROTOCOL   


 


Miscellaneous  1 ea  02/08/18 11:00  





  Probiotic Screen  MC  04/09/18 10:59  





  PRN PRN   





  PROTOCOL   


 


Ondansetron HCl  4 mg  02/06/18 22:42  





  Zofran Odt  PO  04/07/18 22:41  





  Q6H PRN   





  Nausea / Vomiting   


 


Vitamin B Complex/Vitamin C  1 tab  02/07/18 09:00  02/08/18 09:11





  Vitamin B Complex W/C  GT  04/08/18 08:59  Not Given





  DAILY TOMMY   








Review of Systems:


A 12 point ROS was reviewed with the pertinent positive and negatives noted in 

the HPI.





Social History





Smoking Status                   Never smoker


Drug Use                         No


Alcohol Use                      No





Family Medical History





Family Medical History                                     Start:  02/06/18 16:

45


Freq:   ONCE                                               Status: Active      

  


 Document     02/06/18 18:20  KCAIE  (Rec: 02/06/18 18:20  KACIE  [email or web address])


 Family Medical History


     Mother


      History Unknown                            Yes





Physical Exam: 





General: AWAKE ALERT IN NAD





HEENT: NCAT SCLERAE ANICTERIC.  OP MOIST





Neck: SUPPLE +TRACH NO VENT





Cardio: RRR





Respiratory: OCC COARSE BS B/L





Abdominal: SOFT NTD





Genital/Urinary: NORMAL





Extremities: NO EDEMA





Neurological: AWAKE AND ALERT





Assessment: 


1.  ESRD/DIALYSIS STATUS


2.  RESP FAILURE W TRACH STATUS


3.  PNA


4.  UTI


5.  ANEMIA W ESRD


Plan: 





DIALYSIS TOMORROW


CONT ABX


F/U CULTURES


SUPPORTIVE CARE





Signed,





Letty Dave


02/08/181649

## 2018-02-09 RX ADMIN — Medication SCH TAB: at 12:46

## 2018-02-09 RX ADMIN — DOCUSATE SODIUM SCH MG: 50 LIQUID ORAL at 12:47

## 2018-02-09 RX ADMIN — IPRATROPIUM BROMIDE AND ALBUTEROL SULFATE SCH ML: .5; 3 SOLUTION RESPIRATORY (INHALATION) at 07:16

## 2018-02-09 RX ADMIN — IPRATROPIUM BROMIDE AND ALBUTEROL SULFATE SCH ML: .5; 3 SOLUTION RESPIRATORY (INHALATION) at 01:10

## 2018-02-09 RX ADMIN — DOCUSATE SODIUM SCH MG: 50 LIQUID ORAL at 16:35

## 2018-02-09 RX ADMIN — IPRATROPIUM BROMIDE AND ALBUTEROL SULFATE SCH ML: .5; 3 SOLUTION RESPIRATORY (INHALATION) at 12:56

## 2018-02-09 RX ADMIN — IPRATROPIUM BROMIDE AND ALBUTEROL SULFATE SCH ML: .5; 3 SOLUTION RESPIRATORY (INHALATION) at 18:49

## 2018-02-09 RX ADMIN — ASPIRIN 81 MG SCH MG: 81 TABLET ORAL at 12:46

## 2018-02-09 RX ADMIN — HEPARIN SODIUM SCH: 1000 INJECTION, SOLUTION INTRAVENOUS; SUBCUTANEOUS at 07:44

## 2018-02-09 RX ADMIN — HEPARIN SODIUM SCH UNITS: 1000 INJECTION, SOLUTION INTRAVENOUS; SUBCUTANEOUS at 12:48

## 2018-02-09 RX ADMIN — LEVETIRACETAM SCH MG: 100 SOLUTION ORAL at 12:47

## 2018-02-09 NOTE — INTERNAL MEDICINE PROG NOTE
Internal Medicine Subjective





- Subjective


Service Date: 18


Patient seen and examined:: with staff


Patient is:: awake


Per staff patient has:: no adverse event, tolerating meds





Internal Medicine Objective





- Results


Result Diagrams: 


 18 04:57





 18 04:57


Recent Labs: 


 Laboratory Last Values











WBC  9.4 Th/cmm (4.8-10.8)   18  04:57    


 


RBC  3.55 Mil/cmm (3.80-5.80)  L  18  04:57    


 


Hgb  10.9 gm/dL (12-16)  L  18  04:57    


 


Hct  33.4 % (41.0-60)  L  18  04:57    


 


MCV  94.1 fl (80-99)   18  04:57    


 


MCH  30.8 pg (27.0-31.0)   18  04:57    


 


MCHC Differential  32.7 pg (28.0-36.0)   18  04:57    


 


RDW  15.6 % (11.5-20.0)   18  04:57    


 


Plt Count  345 Th/cmm (150-400)   18  04:57    


 


MPV  6.9 fl  18  04:57    


 


Neutrophils %  72.1 % (40.0-80.0)   18  04:57    


 


Lymphocytes %  10.6 % (20.0-50.0)  L  18  04:57    


 


Monocytes %  11.1 % (2.0-10.0)  H  18  04:57    


 


Eosinophils %  6.2 % (0.0-5.0)  H  18  04:57    


 


Basophils %  0.0 % (0.0-2.0)   18  04:57    


 


PT  9.7 SECONDS (9.5-11.5)   18  13:50    


 


INR  0.93  (0.5-1.4)   18  13:50    


 


PTT (Actin FS)  22.7 SECONDS (26.0-38.0)  L  18  13:50    


 


Sodium  131 mEq/L (136-145)  L  18  04:57    


 


Potassium  4.0 mEq/L (3.5-5.1)   18  04:57    


 


Chloride  95 mEq/L ()  L  18  04:57    


 


Carbon Dioxide  24.3 mEq/L (21.0-31.0)   18  04:57    


 


Anion Gap  15.7  (7.0-16.0)   18  04:57    


 


BUN  99 mg/dL (7-25)  H*  18  04:57    


 


Creatinine  7.0 mg/dL (0.7-1.3)  H*  18  04:57    


 


Est GFR (African Amer)  10.2 ml/min (>90)   18  04:57    


 


Est GFR (Non-Af Amer)  8.4 ml/min  18  04:57    


 


BUN/Creatinine Ratio  14.1   18  04:57    


 


Glucose  98 mg/dL ()   18  04:57    


 


Whole Bld Lactic Acid  1.04 mmol/L (0.60-1.99)   18  13:50    


 


Calcium  11.3 mg/dL (8.6-10.3)  H  18  04:57    


 


Creatine Kinase  27 U/L ()  L  18  13:50    


 


Troponin I  0.09 ng/mL (0.01-0.05)  H*  18  13:50    


 


B-Natriuretic Peptide  60.1 pg/mL (5.0-100.0)   18  13:50    


 


Triglycerides  115 mg/dL (<150)   18  04:57    


 


Cholesterol  123 mg/dL (<200)   18  04:57    


 


LDL Cholesterol Direct  70 mg/dL ()  L  18  04:57    


 


HDL Cholesterol  43 mg/dL (23-92)   18  04:57    


 


TSH  1.45 uIU/ml (0.34-5.60)   18  04:57    


 


Urine Source  CLEAN C   18  16:50    


 


Urine Color  YELLOW   18  16:50    


 


Urine Clarity  HAZY  (CLEAR)   18  16:50    


 


Urine pH  6.0  (4.6 - 8.0)   18  16:50    


 


Ur Specific Gravity  1.025  (1.005-1.030)   18  16:50    


 


Urine Protein  >=300 mg/dL (NEGATIVE)   18  16:50    


 


Urine Glucose (UA)  NEGATIVE mg/dL (NEGATIVE)   18  16:50    


 


Urine Ketones  NEGATIVE mg/dL (NEGATIVE)   18  16:50    


 


Urine Blood  MODERATE  (NEGATIVE)  H  18  16:50    


 


Urine Nitrate  NEGATIVE  (NEGATIVE)   18  16:50    


 


Urine Bilirubin  NEGATIVE  (NEGATIVE)   18  16:50    


 


Urine Urobilinogen  0.2 E.U./dL (0.2 - 1.0)   18  16:50    


 


Ur Leukocyte Esterase  SMALL  (NEGATIVE)  H  18  16:50    


 


Urine RBC  10-25 /hpf (0-5)  H  18  16:50    


 


Urine WBC  25-50 /hpf (0-5)  H  18  16:50    


 


Ur Epithelial Cells  NONE SEEN /lpf (FEW)   18  16:50    


 


Urine Bacteria  FEW /hpf (NONE SEEN)   18  16:50    














- Physical Exam


Vitals and I&O: 


 Vital Signs











Temp  98.1 F   18 15:35


 


Pulse  90   18 16:35


 


Resp  20   18 15:35


 


BP  101/80   18 15:35


 


Pulse Ox  100   18 15:35








 Intake & Output











 18





 18:59 06:59 18:59


 


Intake Total 420 450 


 


Balance 420 450 


 


Weight (lbs) 190 lb 191 lb 


 


Intake:   


 


  Intake, IV Amount 50  


 


    cefTRIAXone 1 gm In 50  





    Sodium Chloride 0.9% 50   





    ml @ 100 mls/hr IV Q24HR   





    [facility] Rx#:[id number]   


 


  Tube Feeding 370 450 


 


Other:   


 


  # Voids 1 1 


 


  # Bowel Movements 1 1 


 


  Stool Characteristics Soft  











Active Medications: 


Current Medications





Acetaminophen (Tylenol 650mg/20.3ml Suspension)  650 mg GT Q6H PRN


   PRN Reason: Fever > 101


Acetaminophen (Tylenol 650mg/20.3ml Suspension)  650 mg PO Q6H PRN


   PRN Reason: Pain (Mild)


Albuterol/Ipratropium (Duoneb Neb)  3 ml HHN Q6HRT [facility]


   Stop: 18 00:59


   Last Admin: 18 12:56 Dose:  3 ml


Albuterol/Ipratropium (Duoneb Neb)  3 ml HHN Q2H PRN


   PRN Reason: Shortness of Breath


   Stop: 18 22:41


Amlodipine Besylate (Norvasc)  5 mg GT DAILY TOMMY


   Stop: 18 08:59


   Last Admin: 18 12:46 Dose:  5 mg


Aspirin (Aspirin Chewable)  81 mg GT DAILY TOMMY


   Stop: 18 08:59


   Last Admin: 18 12:46 Dose:  81 mg


Atorvastatin Calcium (Lipitor)  20 mg GT HS TOMMY


   Stop: 18 20:59


   Last Admin: 18 21:34 Dose:  20 mg


Docusate Sodium (Colace)  100 mg GT BID TOMMY


   Stop: 18 08:59


   Last Admin: 18 16:35 Dose:  100 mg


Famotidine (Pepcid)  20 mg GT DAILY TOMMY


   Stop: 18 08:59


   Last Admin: 18 12:46 Dose:  20 mg


Folic Acid (Folate)  1 mg GT DAILY TOMMY


   Stop: 18 08:59


   Last Admin: 18 12:47 Dose:  1 mg


Heparin Sodium (Porcine) (Heparin Sodium)  0 units HD UD TOMMY


   Stop: 02/10/18 00:00


   Last Admin: 18 12:48 Dose:  2,000 units


Lactobacillus Rhamnosus (Culturelle 15b)  1 each PO DAILY TOMMY


   Stop: 04/10/18 08:59


   Last Admin: 18 12:47 Dose:  1 each


Levetiracetam (Keppra)  500 mg GT Q12H TOMMY


   Stop: 18 22:44


   Last Admin: 18 12:47 Dose:  500 mg


Levofloxacin (Levaquin)  250 mg PO Q48H TOMMY


   Stop: 18 13:59


Magnesium Hydroxide (Milk Of Magnesia)  30 ml GT HS PRN


   PRN Reason: Constipation


   Stop: 18 22:41


Metoclopramide HCl (Reglan)  10 mg GT DAILY TOMMY


   Stop: 18 08:59


   Last Admin: 18 12:47 Dose:  10 mg


Miscellaneous (Clinical Monitoring)  1 ea MC PRN PRN


   PRN Reason: RENAL


   Stop: 18 11:02


Miscellaneous (Probiotic Screen)  1 ea MC PRN PRN


   PRN Reason: PROTOCOL


   Stop: 18 10:59


Ondansetron HCl (Zofran Odt)  4 mg PO Q6H PRN


   PRN Reason: Nausea / Vomiting


   Stop: 18 22:41


Vitamin B Complex/Vitamin C (Vitamin B Complex W/C)  1 tab GT DAILY TOMMY


   Stop: 18 08:59


   Last Admin: 18 12:46 Dose:  1 tab








General: alert


HEENT: NC/AT, PERRLA


Neck: Supple


Lungs: CTAB


Abdomen: soft, non-tender, non-distended, positive bowel sound


Neurological: muscle weakness





Internal Medicine Assmt/Plan





- Assessment


Assessment: 





Assessment: 


1.  Pneumonia.


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


3.  Hypertension.


4.  CVA.





- Plan


Plan: 





continue ivabx


supplemental oxygen and bronchodilators 


continue current plan of care 





Nutritional Asmnt/Malnutr-PDOC





- Dietary Evaluation


Malnutrition Findings (Please click <Entered> for more info): 








Nutritional Asmnt/Malnutrition                             Start:  18 17:

38


Text:                                                      Status: Complete    

  


Freq:                                                                          

  


 Document     18 17:38  LCFANIG  (Rec: 18 17:48  [email or web address]  [id number])


 Nutritional Asmnt/Malnutrition


     Patient General Information


      Nutritional Screening                      High Risk


                                                 Consult


      Diagnosis                                  PNA, renal failure


      Pertinent Medical Hx/Surgical Hx           HTN, Dm, CVA, TIA,


                                                 dyslipidemia, ESRD, dementia,


                                                 PEG, tracheostomy


      Subjective Information                     Consult received for pressure


                                                 ulcer. Pt seen lying in bed at


                                                 time of visit. Verified TF


                                                 Novasource running at 40ml/hr.


                                                 Spoke with RN, verified TF is


                                                 scheduled at 45ml/hr, on at


                                                 1pm and off at 8am, which is


                                                 19hr in total. RN will correct


                                                 TF rate and order information


                                                 . per nurse notes , no


                                                 residual noted. Per EMR, pt is


                                                 on dialysis noted.


      Current Diet Order/ Nutrition Support      Novosource Renal 45ml/hr x


                                                 16hr


      Pertinent Medications                      colace, faloate, reglan,


                                                 vitamin B complex, vit C


      Pertinent Labs                              Na 131, K 4.0, Cl 95, BUN


                                                 99, Cr 7.0, Glucose 98, Ca 11.


                                                 3


     Nutritional Hx/Data


      Height                                     5 ft 11 in


      Height (Calculated Centimeters)            180.3


      Current Weight (lbs)                       190 lb


      Weight (Calculated Kilograms)              86.2


      Weight (Calculated Grams)                  30014.6


      Ideal Body Weight                          172


      % Ideal Body Weight                        110


      Body Mass Index (BMI)                      26.4


      Weight Status                              Overweight


     GI Symptoms


      GI Symptoms                                None


      Last BM                                    2/7


      Difficult in:                              None


      Skin Integrity/Comment:                    pressure area to coccyx


     Estimated Nutritional Goals


      Calories/Kcals/Kg                          25-30 based on IBW 75kg


      Kcals Calculated                           1176-2352


      Protein g/k-1.2


      Protein Calculated                         75-90


      Fluid: ml                                  1725-2025ml (1ml/kcal)


     Nutritional Problem


      2. Problem


       Problem                                   increased nutition needs (


                                                 calorie and protein)


       Etiology                                  increrased metabolic demand


                                                 for wound healing


       Signs/Symptoms:                           pressure ulcer to coccyx


      1. Problem


       Problem                                   altered nutrition related lab


                                                 values


       Etiology                                  hx of ESRD


       Signs/Symptoms:                           BUN 99, Cr 7.0


     Malnutrition Alert


      Protein-Calorie Malnutrition               N/A


      Is there a minimum of two criteria         No


       selected?                                 


       Query Text:Check all the applicable       


       criteria. A minimum of two criteria are   


       recommended for diagnosis of either       


       severe or non-severe malnutrition.        


     Intervention/Recommendation


      Comments                                   1. Continue with current TF


                                                 regimen Novasource 45ml/hr x


                                                 19hr. This provids 1710kcal


                                                 and 78g protein, meeting 100%


                                                 of nutritional needs.


                                                 2. Recommend arginaid 1pk


                                                 daily via G tube to help wound


                                                 healing.


                                                 3. Monitor TF rate, tolerance,


                                                 wt weekly, skin integrity and


                                                 labs


                                                 4. F/U as moderate risk in 3-5


                                                 days, 2/10-


     Expected Outcomes/Goals


      Expected Outcomes/Goals                    1. Pt to meet at least 75% of


                                                 nutritional needs via


                                                 nutrition support with


                                                 tolerance


                                                 2. Wt stability, skin to


                                                 remain intact, labs to


                                                 approach WNL.

## 2018-02-09 NOTE — INFECTIOUS DISEASE PROG NOTE
Infectious Disease Subjective





- Review of Systems


Service Date: 18


Subjective: 





No new change, no fever.





Infectious Disease Objective





- Results


Result Diagrams: 


 18 04:57





 18 04:57


Recent Labs: 


 Laboratory Last Values











WBC  9.4 Th/cmm (4.8-10.8)   18  04:57    


 


RBC  3.55 Mil/cmm (3.80-5.80)  L  18  04:57    


 


Hgb  10.9 gm/dL (12-16)  L  18  04:57    


 


Hct  33.4 % (41.0-60)  L  18  04:57    


 


MCV  94.1 fl (80-99)   18  04:57    


 


MCH  30.8 pg (27.0-31.0)   18  04:57    


 


MCHC Differential  32.7 pg (28.0-36.0)   18  04:57    


 


RDW  15.6 % (11.5-20.0)   18  04:57    


 


Plt Count  345 Th/cmm (150-400)   18  04:57    


 


MPV  6.9 fl  18  04:57    


 


Neutrophils %  72.1 % (40.0-80.0)   18  04:57    


 


Lymphocytes %  10.6 % (20.0-50.0)  L  18  04:57    


 


Monocytes %  11.1 % (2.0-10.0)  H  18  04:57    


 


Eosinophils %  6.2 % (0.0-5.0)  H  18  04:57    


 


Basophils %  0.0 % (0.0-2.0)   18  04:57    


 


PT  9.7 SECONDS (9.5-11.5)   18  13:50    


 


INR  0.93  (0.5-1.4)   18  13:50    


 


PTT (Actin FS)  22.7 SECONDS (26.0-38.0)  L  18  13:50    


 


Sodium  131 mEq/L (136-145)  L  18  04:57    


 


Potassium  4.0 mEq/L (3.5-5.1)   18  04:57    


 


Chloride  95 mEq/L ()  L  18  04:57    


 


Carbon Dioxide  24.3 mEq/L (21.0-31.0)   18  04:57    


 


Anion Gap  15.7  (7.0-16.0)   18  04:57    


 


BUN  99 mg/dL (7-25)  H*  18  04:57    


 


Creatinine  7.0 mg/dL (0.7-1.3)  H*  18  04:57    


 


Est GFR (African Amer)  10.2 ml/min (>90)   18  04:57    


 


Est GFR (Non-Af Amer)  8.4 ml/min  18  04:57    


 


BUN/Creatinine Ratio  14.1   18  04:57    


 


Glucose  98 mg/dL ()   18  04:57    


 


Whole Bld Lactic Acid  1.04 mmol/L (0.60-1.99)   18  13:50    


 


Calcium  11.3 mg/dL (8.6-10.3)  H  18  04:57    


 


Creatine Kinase  27 U/L ()  L  18  13:50    


 


Troponin I  0.09 ng/mL (0.01-0.05)  H*  18  13:50    


 


B-Natriuretic Peptide  60.1 pg/mL (5.0-100.0)   18  13:50    


 


Triglycerides  115 mg/dL (<150)   18  04:57    


 


Cholesterol  123 mg/dL (<200)   18  04:57    


 


LDL Cholesterol Direct  70 mg/dL ()  L  18  04:57    


 


HDL Cholesterol  43 mg/dL (23-92)   18  04:57    


 


TSH  1.45 uIU/ml (0.34-5.60)   18  04:57    


 


Urine Source  CLEAN C   18  16:50    


 


Urine Color  YELLOW   18  16:50    


 


Urine Clarity  HAZY  (CLEAR)   18  16:50    


 


Urine pH  6.0  (4.6 - 8.0)   18  16:50    


 


Ur Specific Gravity  1.025  (1.005-1.030)   18  16:50    


 


Urine Protein  >=300 mg/dL (NEGATIVE)   18  16:50    


 


Urine Glucose (UA)  NEGATIVE mg/dL (NEGATIVE)   18  16:50    


 


Urine Ketones  NEGATIVE mg/dL (NEGATIVE)   18  16:50    


 


Urine Blood  MODERATE  (NEGATIVE)  H  18  16:50    


 


Urine Nitrate  NEGATIVE  (NEGATIVE)   18  16:50    


 


Urine Bilirubin  NEGATIVE  (NEGATIVE)   18  16:50    


 


Urine Urobilinogen  0.2 E.U./dL (0.2 - 1.0)   18  16:50    


 


Ur Leukocyte Esterase  SMALL  (NEGATIVE)  H  18  16:50    


 


Urine RBC  10-25 /hpf (0-5)  H  18  16:50    


 


Urine WBC  25-50 /hpf (0-5)  H  18  16:50    


 


Ur Epithelial Cells  NONE SEEN /lpf (FEW)   18  16:50    


 


Urine Bacteria  FEW /hpf (NONE SEEN)   18  16:50    














- Physical Exam


Vitals and I&O: 


 Vital Signs











Temp  97.1 F   18 11:38


 


Pulse  72   18 12:56


 


Resp  18   18 12:56


 


BP  149/103   18 12:46


 


Pulse Ox  98   18 12:56








 Intake & Output











 18





 18:59 06:59 18:59


 


Intake Total 420 450 


 


Balance 420 450 


 


Weight (lbs) 86.183 kg 86.636 kg 


 


Intake:   


 


  Intake, IV Amount 50  


 


    cefTRIAXone 1 gm In 50  





    Sodium Chloride 0.9% 50   





    ml @ 100 mls/hr IV Q24HR   





    [facility] Rx#:[id number]   


 


  Tube Feeding 370 450 


 


Other:   


 


  # Voids 1 1 


 


  # Bowel Movements 1 1 


 


  Stool Characteristics Soft  











Active Medications: 


Current Medications





Acetaminophen (Tylenol 650mg/20.3ml Suspension)  650 mg GT Q6H PRN


   PRN Reason: Fever > 101


Acetaminophen (Tylenol 650mg/20.3ml Suspension)  650 mg PO Q6H PRN


   PRN Reason: Pain (Mild)


Albuterol/Ipratropium (Duoneb Neb)  3 ml HHN Q6HRT TOMMY


   Stop: 18 00:59


   Last Admin: 18 12:56 Dose:  3 ml


Albuterol/Ipratropium (Duoneb Neb)  3 ml HHN Q2H PRN


   PRN Reason: Shortness of Breath


   Stop: 18 22:41


Amlodipine Besylate (Norvasc)  5 mg GT DAILY TOMMY


   Stop: 18 08:59


   Last Admin: 18 12:46 Dose:  5 mg


Aspirin (Aspirin Chewable)  81 mg GT DAILY TOMMY


   Stop: 18 08:59


   Last Admin: 18 12:46 Dose:  81 mg


Atorvastatin Calcium (Lipitor)  20 mg GT HS TOMMY


   Stop: 18 20:59


   Last Admin: 18 21:34 Dose:  20 mg


Docusate Sodium (Colace)  100 mg GT BID TOMMY


   Stop: 18 08:59


   Last Admin: 18 12:47 Dose:  100 mg


Famotidine (Pepcid)  20 mg GT DAILY TOMMY


   Stop: 18 08:59


   Last Admin: 18 12:46 Dose:  20 mg


Folic Acid (Folate)  1 mg GT DAILY TOMMY


   Stop: 18 08:59


   Last Admin: 18 12:47 Dose:  1 mg


Heparin Sodium (Porcine) (Heparin Sodium)  0 units HD UD TOMMY


   Stop: 02/10/18 00:00


   Last Admin: 18 12:48 Dose:  2,000 units


Lactobacillus Rhamnosus (Culturelle 15b)  1 each PO DAILY TOMMY


   Stop: 04/10/18 08:59


   Last Admin: 18 12:47 Dose:  1 each


Levetiracetam (Keppra)  500 mg GT Q12H TOMMY


   Stop: 18 22:44


   Last Admin: 18 12:47 Dose:  500 mg


Levofloxacin (Levaquin)  500 mg PO X1 ONE


   Stop: 18 13:11


Levofloxacin (Levaquin)  250 mg PO Q48H TOMMY


   Stop: 04/10/18 13:14


Magnesium Hydroxide (Milk Of Magnesia)  30 ml GT HS PRN


   PRN Reason: Constipation


   Stop: 18 22:41


Metoclopramide HCl (Reglan)  10 mg GT DAILY TOMMY


   Stop: 18 08:59


   Last Admin: 18 12:47 Dose:  10 mg


Miscellaneous (Clinical Monitoring)  1 ea MC PRN PRN


   PRN Reason: RENAL


   Stop: 18 11:02


Miscellaneous (Probiotic Screen)  1 ea MC PRN PRN


   PRN Reason: PROTOCOL


   Stop: 18 10:59


Ondansetron HCl (Zofran Odt)  4 mg PO Q6H PRN


   PRN Reason: Nausea / Vomiting


   Stop: 18 22:41


Vitamin B Complex/Vitamin C (Vitamin B Complex W/C)  1 tab GT DAILY TOMMY


   Stop: 18 08:59


   Last Admin: 18 12:46 Dose:  1 tab








General: no acute distress, well developed, well nourished


HEENT: atraumatic, normocephalic, PERRLA


Neck: supple, no thyromegaly


Cardiovascular: S1S2, regular


Lungs: clear to auscultation bilaterally, clear to percussion


Abdomen: soft, no tender, no distended, no rebound


Extremities: no cyanosis, no clubbing


Neurological: awake, alert


Skin: intact





Infectious Disease Assmt/Plan





- Assessment


Assessment: 





Impression:


1.Pneumonia.


2. CVA


3. CKD 5 on HD.


4. HTN





- Plan


Plan: 





DC plan to snf on levaquin po.





Nutritional Asmnt/Malnutr-PDOC





- Dietary Evaluation


Malnutrition Findings (Please click <Entered> for more info): 








Nutritional Asmnt/Malnutrition                             Start:  18 17:

38


Text:                                                      Status: Complete    

  


Freq:                                                                          

  


 Document     18 17:38  FANI  (Rec: 18 17:48  [facility]


 Nutritional Asmnt/Malnutrition


     Patient General Information


      Nutritional Screening                      High Risk


                                                 Consult


      Diagnosis                                  PNA, renal failure


      Pertinent Medical Hx/Surgical Hx           HTN, Dm, CVA, TIA,


                                                 dyslipidemia, ESRD, dementia,


                                                 PEG, tracheostomy


      Subjective Information                     Consult received for pressure


                                                 ulcer. Pt seen lying in bed at


                                                 time of visit. Verified TF


                                                 Novasource running at 40ml/hr.


                                                 Spoke with RN, verified TF is


                                                 scheduled at 45ml/hr, on at


                                                 1pm and off at 8am, which is


                                                 19hr in total. RN will correct


                                                 TF rate and order information


                                                 . per nurse notes , no


                                                 residual noted. Per EMR, pt is


                                                 on dialysis noted.


      Current Diet Order/ Nutrition Support      Novosource Renal 45ml/hr x


                                                 16hr


      Pertinent Medications                      colace, faloate, reglan,


                                                 vitamin B complex, vit C


      Pertinent Labs                              Na 131, K 4.0, Cl 95, BUN


                                                 99, Cr 7.0, Glucose 98, Ca 11.


                                                 3


     Nutritional Hx/Data


      Height                                     1.8 m


      Height (Calculated Centimeters)            180.3


      Current Weight (lbs)                       86.183 kg


      Weight (Calculated Kilograms)              86.2


      Weight (Calculated Grams)                  82502.6


      Ideal Body Weight                          172


      % Ideal Body Weight                        110


      Body Mass Index (BMI)                      26.4


      Weight Status                              Overweight


     GI Symptoms


      GI Symptoms                                None


      Last BM                                    


      Difficult in:                              None


      Skin Integrity/Comment:                    pressure area to coccyx


     Estimated Nutritional Goals


      Calories/Kcals/Kg                          25-30 based on IBW 75kg


      Kcals Calculated                           1755-2891


      Protein g/k-1.2


      Protein Calculated                         75-90


      Fluid: ml                                  1725-2025ml (1ml/kcal)


     Nutritional Problem


      2. Problem


       Problem                                   increased nutition needs (


                                                 calorie and protein)


       Etiology                                  increrased metabolic demand


                                                 for wound healing


       Signs/Symptoms:                           pressure ulcer to coccyx


      1. Problem


       Problem                                   altered nutrition related lab


                                                 values


       Etiology                                  hx of ESRD


       Signs/Symptoms:                           BUN 99, Cr 7.0


     Malnutrition Alert


      Protein-Calorie Malnutrition               N/A


      Is there a minimum of two criteria         No


       selected?                                 


       Query Text:Check all the applicable       


       criteria. A minimum of two criteria are   


       recommended for diagnosis of either       


       severe or non-severe malnutrition.        


     Intervention/Recommendation


      Comments                                   1. Continue with current TF


                                                 regimen Novasource 45ml/hr x


                                                 19hr. This provids 1710kcal


                                                 and 78g protein, meeting 100%


                                                 of nutritional needs.


                                                 2. Recommend arginaid 1pk


                                                 daily via G tube to help wound


                                                 healing.


                                                 3. Monitor TF rate, tolerance,


                                                 wt weekly, skin integrity and


                                                 labs


                                                 4. F/U as moderate risk in 3-5


                                                 days, 2/10-


     Expected Outcomes/Goals


      Expected Outcomes/Goals                    1. Pt to meet at least 75% of


                                                 nutritional needs via


                                                 nutrition support with


                                                 tolerance


                                                 2. Wt stability, skin to


                                                 remain intact, labs to


                                                 approach WNL.

## 2018-02-13 NOTE — DISCHARGE SUMMARY
DATE OF DISCHARGE:  02/09/2018



HOSPITAL COURSE:  The patient admitted on 02/06 at [facility],

discharged on 02/09.  A 67-year-old male patient admitted for severe weakness,

tachycardia and during the dialysis and the patient was hemodynamically

unstable.  Initial diagnosis was pneumonia stage 5, CKD on hemodialysis,

hypertension, CVA.  The patient was given treatment with IV antibiotic and

bronchodilator.  The patient improved and ID consult was done as well as

Nephrology.  The patient has been in stable condition on 02/09, was discharged

back to [facility] where I will follow the patient in the next 3 days with the

final diagnosis of pneumonia, improving ESRD, and history of hypertension.



CONDITION AT TIME OF DISCHARGE:  Stable.



MEDICATIONS:  See the reconciliation sheet.



ACTIVITY:  As tolerated.





DD: 02/13/2018 08:01

DT: 02/13/2018 19:04

JOB# [id number]  [id number]